# Patient Record
Sex: MALE | Race: WHITE | NOT HISPANIC OR LATINO | RURAL
[De-identification: names, ages, dates, MRNs, and addresses within clinical notes are randomized per-mention and may not be internally consistent; named-entity substitution may affect disease eponyms.]

---

## 2023-09-29 ENCOUNTER — TELEPHONE (OUTPATIENT)
Dept: ORTHOPEDICS | Facility: CLINIC | Age: 38
End: 2023-09-29
Payer: COMMERCIAL

## 2023-09-29 DIAGNOSIS — M25.561 RIGHT KNEE PAIN, UNSPECIFIED CHRONICITY: Primary | ICD-10-CM

## 2023-09-29 NOTE — TELEPHONE ENCOUNTER
----- Message from Rimma Mustafa sent at 9/29/2023  8:30 AM CDT -----  Regarding: red priority  Symptom: Knee Injury  Outcome: Transfer to a nurse or provider NOW!  Reason: Severe pain now    The caller accepted this outcome  Patient in severe pain. Can't hardly bend knee. Call back # [phone] Requested someone that come to [place].

## 2023-10-02 ENCOUNTER — HOSPITAL ENCOUNTER (OUTPATIENT)
Dept: RADIOLOGY | Facility: HOSPITAL | Age: 38
Discharge: HOME | End: 2023-10-02
Attending: ORTHOPAEDIC SURGERY
Payer: COMMERCIAL

## 2023-10-02 ENCOUNTER — OFFICE VISIT (OUTPATIENT)
Dept: ORTHOPEDICS | Facility: CLINIC | Age: 38
End: 2023-10-02
Payer: COMMERCIAL

## 2023-10-02 DIAGNOSIS — M25.561 RIGHT KNEE PAIN, UNSPECIFIED CHRONICITY: Primary | ICD-10-CM

## 2023-10-02 DIAGNOSIS — M76.51 PATELLAR TENDINITIS OF RIGHT KNEE: ICD-10-CM

## 2023-10-02 DIAGNOSIS — M25.561 RIGHT KNEE PAIN, UNSPECIFIED CHRONICITY: ICD-10-CM

## 2023-10-02 PROCEDURE — 99999PBSHW PR PBB SHADOW TECHNICAL ONLY FILED TO HB: ICD-10-PCS | Mod: PBBFAC,,,

## 2023-10-02 PROCEDURE — 73562 X-RAY EXAM OF KNEE 3: CPT | Mod: TC,RT

## 2023-10-02 PROCEDURE — 20610 DRAIN/INJ JOINT/BURSA W/O US: CPT | Mod: PBBFAC,RT | Performed by: ORTHOPAEDIC SURGERY

## 2023-10-02 PROCEDURE — 99204 OFFICE O/P NEW MOD 45 MIN: CPT | Mod: S$PBB,25,, | Performed by: ORTHOPAEDIC SURGERY

## 2023-10-02 PROCEDURE — 73562 XR KNEE AP LAT WITH SUNRISE RIGHT: ICD-10-PCS | Mod: 26,RT,, | Performed by: ORTHOPAEDIC SURGERY

## 2023-10-02 PROCEDURE — 99999PBSHW PR PBB SHADOW TECHNICAL ONLY FILED TO HB: Mod: PBBFAC,,,

## 2023-10-02 PROCEDURE — 20610 PR DRAIN/INJECT LARGE JOINT/BURSA: ICD-10-PCS | Mod: S$PBB,RT,, | Performed by: ORTHOPAEDIC SURGERY

## 2023-10-02 PROCEDURE — 99213 OFFICE O/P EST LOW 20 MIN: CPT | Mod: PBBFAC | Performed by: ORTHOPAEDIC SURGERY

## 2023-10-02 PROCEDURE — 99204 PR OFFICE/OUTPT VISIT, NEW, LEVL IV, 45-59 MIN: ICD-10-PCS | Mod: S$PBB,25,, | Performed by: ORTHOPAEDIC SURGERY

## 2023-10-02 PROCEDURE — 73562 X-RAY EXAM OF KNEE 3: CPT | Mod: 26,RT,, | Performed by: ORTHOPAEDIC SURGERY

## 2023-10-02 PROCEDURE — 20610 DRAIN/INJ JOINT/BURSA W/O US: CPT | Mod: S$PBB,RT,, | Performed by: ORTHOPAEDIC SURGERY

## 2023-10-02 RX ORDER — COLCHICINE 0.6 MG/1
TABLET ORAL
COMMUNITY
Start: 2023-06-12

## 2023-10-02 RX ORDER — BUPIVACAINE HYDROCHLORIDE 2.5 MG/ML
1 INJECTION, SOLUTION INFILTRATION; PERINEURAL
Status: COMPLETED | OUTPATIENT
Start: 2023-10-02 | End: 2023-10-02

## 2023-10-02 RX ORDER — TRIAMCINOLONE ACETONIDE 40 MG/ML
40 INJECTION, SUSPENSION INTRA-ARTICULAR; INTRAMUSCULAR
Status: COMPLETED | OUTPATIENT
Start: 2023-10-02 | End: 2023-10-02

## 2023-10-02 RX ADMIN — TRIAMCINOLONE ACETONIDE 40 MG: 40 INJECTION, SUSPENSION INTRA-ARTICULAR; INTRAMUSCULAR at 04:10

## 2023-10-02 RX ADMIN — BUPIVACAINE HYDROCHLORIDE 2.5 MG: 2.5 INJECTION, SOLUTION INFILTRATION; PERINEURAL at 04:10

## 2023-10-02 NOTE — PROGRESS NOTES
CLINIC NOTE       Chief Complaint   Patient presents with    Right Knee - Pain        Alex Padilla is a 38 y.o. male seen today for evaluation of right knee pain.  He has a history of Osgood-Schlatter's disease and gout.  He is had intermittent flares of anterior knee pain involving the patellar tendon region.  Is experiencing such a flare at this time.  He does take allopurinol a regular basis.  He saw his PCP and Cristina Khalil was told his serum uric acid was slightly elevated.  He was self-employed and runs a chicken farm.  He is taking diclofenac 75 mg p.o. b.i.d..      No past medical history on file.  No family history on file.  Current Outpatient Medications on File Prior to Visit   Medication Sig Dispense Refill    colchicine, gout, (COLCRYS) 0.6 mg tablet TAKE TWO TABLETS BY MOUTH at the onset of flare, wait 1 hour then take 1 tablet AS DIRECTED       No current facility-administered medications on file prior to visit.       ROS     There were no vitals filed for this visit.    No past surgical history on file.     Review of patient's allergies indicates:  No Known Allergies     Ortho Exam well-developed well-nourished  male no acute distress.  Alert oriented cooperative.  Neck is supple without JVD.  Breathing is regular nonlabored.  Skin is warm and dry no lesions seen.  Exam of the right knee shows mild intra-articular effusion.  There is exquisite tenderness to palpation over an enlarged area extending from the tibial tubercle to the patellar tendon.  Patellar tendon mechanism was palpably intact.  He has pain with attempted knee extension.    Radiographic Examination:  Right knee 10/02/2023    Technique:  Three views AP lateral and patellar  Findings:  The bones well mineralized.  There irregular and fragmented    Impression:   See Above    Assessment and Plan  There are no problems to display for this patient.   Impression:  Patellar tendinitis, gout  Plan:  Right knee was prepped  with Betadine intra-articular steroid injection performed with triamcinolone and Marcaine 1 cc each.  Continue with diclofenac.  Ice therapy and massage outlined.  Voltaren gel topical application discussed.  Farrukh Hadley M.D.

## 2023-10-25 ENCOUNTER — OFFICE VISIT (OUTPATIENT)
Dept: ORTHOPEDICS | Facility: CLINIC | Age: 38
End: 2023-10-25
Payer: COMMERCIAL

## 2023-10-25 DIAGNOSIS — M76.51 PATELLAR TENDINITIS OF RIGHT KNEE: ICD-10-CM

## 2023-10-25 DIAGNOSIS — M70.41 PREPATELLAR BURSITIS OF RIGHT KNEE: Primary | ICD-10-CM

## 2023-10-25 DIAGNOSIS — M25.561 RIGHT KNEE PAIN, UNSPECIFIED CHRONICITY: ICD-10-CM

## 2023-10-25 PROCEDURE — 20610 PR DRAIN/INJECT LARGE JOINT/BURSA: ICD-10-PCS | Mod: S$PBB,RT,, | Performed by: ORTHOPAEDIC SURGERY

## 2023-10-25 PROCEDURE — 1160F PR REVIEW ALL MEDS BY PRESCRIBER/CLIN PHARMACIST DOCUMENTED: ICD-10-PCS | Mod: CPTII,,, | Performed by: ORTHOPAEDIC SURGERY

## 2023-10-25 PROCEDURE — 1159F PR MEDICATION LIST DOCUMENTED IN MEDICAL RECORD: ICD-10-PCS | Mod: CPTII,,, | Performed by: ORTHOPAEDIC SURGERY

## 2023-10-25 PROCEDURE — 99214 PR OFFICE/OUTPT VISIT, EST, LEVL IV, 30-39 MIN: ICD-10-PCS | Mod: S$PBB,25,, | Performed by: ORTHOPAEDIC SURGERY

## 2023-10-25 PROCEDURE — 20610 DRAIN/INJ JOINT/BURSA W/O US: CPT | Mod: S$PBB,RT,, | Performed by: ORTHOPAEDIC SURGERY

## 2023-10-25 PROCEDURE — 1160F RVW MEDS BY RX/DR IN RCRD: CPT | Mod: CPTII,,, | Performed by: ORTHOPAEDIC SURGERY

## 2023-10-25 PROCEDURE — 1159F MED LIST DOCD IN RCRD: CPT | Mod: CPTII,,, | Performed by: ORTHOPAEDIC SURGERY

## 2023-10-25 PROCEDURE — 20610 DRAIN/INJ JOINT/BURSA W/O US: CPT | Mod: PBBFAC | Performed by: ORTHOPAEDIC SURGERY

## 2023-10-25 PROCEDURE — 99999PBSHW PR PBB SHADOW TECHNICAL ONLY FILED TO HB: Mod: PBBFAC,,,

## 2023-10-25 PROCEDURE — 99214 OFFICE O/P EST MOD 30 MIN: CPT | Mod: S$PBB,25,, | Performed by: ORTHOPAEDIC SURGERY

## 2023-10-25 PROCEDURE — 99999PBSHW PR PBB SHADOW TECHNICAL ONLY FILED TO HB: ICD-10-PCS | Mod: PBBFAC,,,

## 2023-10-25 PROCEDURE — 99213 OFFICE O/P EST LOW 20 MIN: CPT | Mod: PBBFAC,25 | Performed by: ORTHOPAEDIC SURGERY

## 2023-10-25 RX ORDER — BUPIVACAINE HYDROCHLORIDE 2.5 MG/ML
1 INJECTION, SOLUTION INFILTRATION; PERINEURAL
Status: COMPLETED | OUTPATIENT
Start: 2023-10-25 | End: 2023-10-25

## 2023-10-25 RX ORDER — MELOXICAM 15 MG/1
15 TABLET ORAL DAILY
Qty: 30 TABLET | Refills: 1 | Status: SHIPPED | OUTPATIENT
Start: 2023-10-25

## 2023-10-25 RX ORDER — TRIAMCINOLONE ACETONIDE 40 MG/ML
40 INJECTION, SUSPENSION INTRA-ARTICULAR; INTRAMUSCULAR
Status: COMPLETED | OUTPATIENT
Start: 2023-10-25 | End: 2023-10-25

## 2023-10-25 RX ADMIN — TRIAMCINOLONE ACETONIDE 40 MG: 40 INJECTION, SUSPENSION INTRA-ARTICULAR; INTRAMUSCULAR at 01:10

## 2023-10-25 RX ADMIN — BUPIVACAINE HYDROCHLORIDE 2.5 MG: 2.5 INJECTION, SOLUTION INFILTRATION; PERINEURAL at 01:10

## 2023-10-25 NOTE — PROGRESS NOTES
CLINIC NOTE       Chief Complaint   Patient presents with    Right Knee - Pain        Alex Padilla is a 38 y.o. male seen today for recheck of his right knee.  He was seen 10/02/2023 with patellar tendinitis an old Osgood-Schlatter's disease.  He underwent intra-articular steroid injection in the knee has been taking diclofenac and using Voltaren gel.  He had complete relief of symptoms until a few days ago.  He was squatting doing chores and squatting playing baseball with his daughter.  Had subsequent anterior knee pain.  He has pain with attempted knee flexion.  He is ambulating independently.    No past medical history on file.  No family history on file.  Current Outpatient Medications on File Prior to Visit   Medication Sig Dispense Refill    colchicine, gout, (COLCRYS) 0.6 mg tablet TAKE TWO TABLETS BY MOUTH at the onset of flare, wait 1 hour then take 1 tablet AS DIRECTED       No current facility-administered medications on file prior to visit.       ROS     There were no vitals filed for this visit.    No past surgical history on file.     Review of patient's allergies indicates:  No Known Allergies     Ortho Exam :  Well-developed well-nourished  male no acute distress.  He is alert oriented cooperative.  Exam of the right knee shows no sign of effusion.  There is mild swelling over the anterior prepatellar region of the knee with exquisite tenderness palpation.  He is not tender over the quadriceps or patellar tendon.  He can fully extend his knee against gravity and resistance.  Radiographic Examination:    Technique:    Findings:    Impression:   See Above    Assessment and Plan  Patient Active Problem List    Diagnosis Date Noted    Patellar tendinitis of right knee 10/02/2023    Impression:  Prepatellar bursitis-right knee  Plan:  Right knee was prepped with Betadine prepatellar bursa of injected with triamcinolone and Marcaine 1 cc each.  He is run out of diclofenac.  Rx Mobic 15 mg  1 p.o. q.d. p.r.n. avoid kneeling or squatting.  Recheck if symptoms persist or worsen.    Freddy Hadley M.D.

## 2025-03-17 ENCOUNTER — OFFICE VISIT (OUTPATIENT)
Dept: ORTHOPEDICS | Facility: CLINIC | Age: 40
End: 2025-03-17
Payer: COMMERCIAL

## 2025-03-17 ENCOUNTER — HOSPITAL ENCOUNTER (OUTPATIENT)
Dept: RADIOLOGY | Facility: HOSPITAL | Age: 40
Discharge: HOME | End: 2025-03-17
Attending: NURSE PRACTITIONER
Payer: COMMERCIAL

## 2025-03-17 DIAGNOSIS — M25.561 RIGHT KNEE PAIN, UNSPECIFIED CHRONICITY: ICD-10-CM

## 2025-03-17 DIAGNOSIS — M25.561 RIGHT KNEE PAIN, UNSPECIFIED CHRONICITY: Primary | ICD-10-CM

## 2025-03-17 PROCEDURE — 99213 OFFICE O/P EST LOW 20 MIN: CPT | Mod: S$PBB,,, | Performed by: NURSE PRACTITIONER

## 2025-03-17 PROCEDURE — 99212 OFFICE O/P EST SF 10 MIN: CPT | Mod: PBBFAC,25 | Performed by: NURSE PRACTITIONER

## 2025-03-17 PROCEDURE — 99999 PR PBB SHADOW E&M-EST. PATIENT-LVL II: CPT | Mod: PBBFAC,,, | Performed by: NURSE PRACTITIONER

## 2025-03-17 PROCEDURE — 73564 X-RAY EXAM KNEE 4 OR MORE: CPT | Mod: 26,RT,, | Performed by: RADIOLOGY

## 2025-03-17 PROCEDURE — 73564 X-RAY EXAM KNEE 4 OR MORE: CPT | Mod: TC,RT

## 2025-03-17 NOTE — PROGRESS NOTES
CC:  Knee pain    40 y.o. Male returns to clinic for a follow up visit regarding knee pain.       Patient has seen Dr. Hadley in the past for his right knee.   He has had injections in the past with the last being 10/25/2024.     He reports he continues to have knee pain and would like to get an MRI.   He is ready to discuss surgically intervention a this time.  Reports he did discuss this in the past with Dr. Premier mackenzie but they try to hold off on surgery.  He has completed formal physical therapy in the past without improvement.  More recently he has completed a home exercise program with strengthening stretching.  Completed 8 weeks of a home exercise program 3 days a week for 30 minutes without improvement.  Still having knee pain daily.  Knee pain does interfere with his daily activity.             No past medical history on file.  No past surgical history on file.      PHYSICAL EXAMINATION:  There were no vitals taken for this visit.            Right Knee Exam     Inspection   Swelling: absent  Bruising: absent    Tenderness   The patient is tender to palpation of the tibial tubercle and patella.    Range of Motion   Extension:  normal   Flexion:  normal     Other   Sensation: normal    Vascular Exam     Right Pulses  Dorsalis Pedis:      2+            IMAGING:  X-Ray Knee Complete 4 Or More Views Right  Result Date: 3/20/2025  EXAMINATION: XR KNEE COMP 4 OR MORE VIEWS RIGHT CLINICAL HISTORY: Pain in right knee COMPARISON: 10/02/2023 FINDINGS: No acute fracture or malalignment of the right knee.  Fragmentation of the tibial tuberosity and thickening of the patellar tendon suggestive of remote Osgood-Schlatter type pathology, similar to prior.  Joint spaces of the knee appear maintained.  No large joint effusion or other soft tissue abnormality.     No acute osseous abnormality. Tibial tuberosity fragmentation and patellar tendon thickening suggestive of remote Osgood Freeman type pathology.  Electronically signed by: Ramon Crenshaw Date:    03/20/2025 Time:    11:57       ASSESSMNT:      ICD-10-CM ICD-9-CM   1. Right knee pain, unspecified chronicity  M25.561 719.46       PLAN:     -Findings and treatment options were discussed with the patient  -All questions answered  Natural history and expected course discussed. Questions answered.  Educational materials distributed.  Reduction in offending activity.  OTC analgesics as needed.  MRI.  We will order MRI of his right knee and have him return to clinic with Dr. Premier mackenzie after to discuss options.    There are no Patient Instructions on file for this visit.      Orders Placed This Encounter   Procedures    X-Ray Knee Complete 4 Or More Views Right    MRI Knee Without Contrast Right         Procedures

## 2025-03-24 ENCOUNTER — TELEPHONE (OUTPATIENT)
Dept: ORTHOPEDICS | Facility: CLINIC | Age: 40
End: 2025-03-24
Payer: COMMERCIAL

## 2025-03-24 NOTE — TELEPHONE ENCOUNTER
CALLING PATIENT TO LET HIM KNOW HIS MRI IS STILL UNDER REVIEW BY HIS INSURANCE SO WE HAD TO RESCHEDULE.   NEW APPT. 3/26/25 AT 2:00 PENDING INSURANCE APPROVAL.

## 2025-03-25 ENCOUNTER — TELEPHONE (OUTPATIENT)
Dept: ORTHOPEDICS | Facility: CLINIC | Age: 40
End: 2025-03-25
Payer: COMMERCIAL

## 2025-03-26 ENCOUNTER — TELEPHONE (OUTPATIENT)
Dept: ORTHOPEDICS | Facility: CLINIC | Age: 40
End: 2025-03-26
Payer: COMMERCIAL

## 2025-03-26 NOTE — TELEPHONE ENCOUNTER
MRI is still under Medical Review by the insurance. Additional requested documentation was submitted today.     MRI rescheduled for 4/3 at 11:30  F/U with Dr. Hadley scheduled 4/7 at 11:20

## 2025-04-03 ENCOUNTER — HOSPITAL ENCOUNTER (OUTPATIENT)
Dept: RADIOLOGY | Facility: HOSPITAL | Age: 40
Discharge: HOME | End: 2025-04-03
Attending: NURSE PRACTITIONER
Payer: COMMERCIAL

## 2025-04-03 DIAGNOSIS — M25.561 RIGHT KNEE PAIN, UNSPECIFIED CHRONICITY: ICD-10-CM

## 2025-04-03 PROCEDURE — 73721 MRI JNT OF LWR EXTRE W/O DYE: CPT | Mod: TC,RT

## 2025-04-03 PROCEDURE — 73721 MRI JNT OF LWR EXTRE W/O DYE: CPT | Mod: 26,RT,, | Performed by: RADIOLOGY

## 2025-04-07 ENCOUNTER — OFFICE VISIT (OUTPATIENT)
Dept: ORTHOPEDICS | Facility: CLINIC | Age: 40
End: 2025-04-07
Payer: COMMERCIAL

## 2025-04-07 DIAGNOSIS — M76.51 PATELLAR TENDINITIS OF RIGHT KNEE: Primary | ICD-10-CM

## 2025-04-07 DIAGNOSIS — M92.521 OSGOOD-SCHLATTER'S DISEASE OF RIGHT LOWER EXTREMITY: ICD-10-CM

## 2025-04-07 PROCEDURE — 99999 PR PBB SHADOW E&M-EST. PATIENT-LVL III: CPT | Mod: PBBFAC,,, | Performed by: ORTHOPAEDIC SURGERY

## 2025-04-07 PROCEDURE — 99214 OFFICE O/P EST MOD 30 MIN: CPT | Mod: S$PBB,,, | Performed by: ORTHOPAEDIC SURGERY

## 2025-04-07 PROCEDURE — 1160F RVW MEDS BY RX/DR IN RCRD: CPT | Mod: CPTII,,, | Performed by: ORTHOPAEDIC SURGERY

## 2025-04-07 PROCEDURE — 99213 OFFICE O/P EST LOW 20 MIN: CPT | Mod: PBBFAC | Performed by: ORTHOPAEDIC SURGERY

## 2025-04-07 PROCEDURE — 1159F MED LIST DOCD IN RCRD: CPT | Mod: CPTII,,, | Performed by: ORTHOPAEDIC SURGERY

## 2025-04-07 RX ORDER — MELOXICAM 15 MG/1
15 TABLET ORAL DAILY PRN
Qty: 30 TABLET | Refills: 2 | Status: SHIPPED | OUTPATIENT
Start: 2025-04-07

## 2025-04-07 NOTE — PROGRESS NOTES
CLINIC NOTE       Chief Complaint   Patient presents with    Right Knee - Follow-up        Alex Padilla is a 40 y.o. male seen today for recheck of his right knee.  He has seen in the past for Osgood-Schlatter's disease.  He had an intra-articular steroid injection in October of 2025 for prepatellar bursitis.  He is currently in any not using any oral anti-inflammatory medications.  He has used Mobic in the past.  He had a flare of symptoms that has now resolved.    X-rays right knee 03/17/2025 shows joint to be normally aligned.  There is fragmentation of the tibial tubercle consistent with Osgood-Schlatter's disease.    MRI examination of the right knee 04/03/2025 is consistent with Osgood-Schlatter's disease as well.  History reviewed. No pertinent past medical history.  No family history on file.  Medications Ordered Prior to Encounter[1]    ROS     There were no vitals filed for this visit.    History reviewed. No pertinent surgical history.     Review of patient's allergies indicates:  No Known Allergies     Ortho Exam : Right knee is normal contour.  No effusion.  Knee range motion 0-130 degrees of flexion.  Knee ligaments stable clinically.  Patella tracks well in the midline.  There is prominence of the tibial tubercle with mild tenderness palpation along it and the patellar tendon course.    Radiographic Examination:    Technique:    Findings:    Impression:   See Above    Assessment and Plan  Patient Active Problem List    Diagnosis Date Noted    Patellar tendinitis of right knee 10/02/2023    Impression: Chronic Osgood-Schlatter's disease/patellar tendinitis-right knee   Plan: Rx Mobic 15 mg 1 p.o. q.d. p.r.n. 2.  Living strap issued 3. Ice therapy      Freddy Hadley M.D.                   [1]   Current Outpatient Medications on File Prior to Visit   Medication Sig Dispense Refill    colchicine, gout, (COLCRYS) 0.6 mg tablet TAKE TWO TABLETS BY MOUTH at the onset of flare, wait 1 hour then  take 1 tablet AS DIRECTED      meloxicam (MOBIC) 15 MG tablet Take 1 tablet (15 mg total) by mouth once daily. 30 tablet 1     No current facility-administered medications on file prior to visit.